# Patient Record
Sex: FEMALE | URBAN - METROPOLITAN AREA
[De-identification: names, ages, dates, MRNs, and addresses within clinical notes are randomized per-mention and may not be internally consistent; named-entity substitution may affect disease eponyms.]

---

## 2018-07-10 ENCOUNTER — TRANSFERRED RECORDS (OUTPATIENT)
Dept: HEALTH INFORMATION MANAGEMENT | Facility: CLINIC | Age: 68
End: 2018-07-10

## 2018-07-11 ENCOUNTER — TRANSFERRED RECORDS (OUTPATIENT)
Dept: HEALTH INFORMATION MANAGEMENT | Facility: CLINIC | Age: 68
End: 2018-07-11

## 2018-07-13 ENCOUNTER — TRANSFERRED RECORDS (OUTPATIENT)
Dept: HEALTH INFORMATION MANAGEMENT | Facility: CLINIC | Age: 68
End: 2018-07-13

## 2018-07-13 LAB
ANION GAP SERPL CALCULATED.3IONS-SCNC: NORMAL MMOL/L
BUN SERPL-MCNC: NORMAL MG/DL
CALCIUM SERPL-MCNC: NORMAL MG/DL
CHLORIDE SERPLBLD-SCNC: NORMAL MMOL/L
CO2 SERPL-SCNC: NORMAL MMOL/L
CREAT SERPL-MCNC: 0.73 MG/DL
GFR SERPL CREATININE-BSD FRML MDRD: >60 ML/MIN/1.73M2
GLUCOSE SERPL-MCNC: NORMAL MG/DL (ref 70–99)
POTASSIUM SERPL-SCNC: 3.8 MMOL/L
SODIUM SERPL-SCNC: 136 MMOL/L

## 2018-07-17 ENCOUNTER — TRANSFERRED RECORDS (OUTPATIENT)
Dept: HEALTH INFORMATION MANAGEMENT | Facility: CLINIC | Age: 68
End: 2018-07-17

## 2018-07-17 DIAGNOSIS — C79.31 METASTASIS TO BRAIN (H): Primary | ICD-10-CM

## 2018-07-18 ENCOUNTER — TRANSFERRED RECORDS (OUTPATIENT)
Dept: HEALTH INFORMATION MANAGEMENT | Facility: CLINIC | Age: 68
End: 2018-07-18

## 2018-07-19 ENCOUNTER — TRANSFERRED RECORDS (OUTPATIENT)
Dept: HEALTH INFORMATION MANAGEMENT | Facility: CLINIC | Age: 68
End: 2018-07-19

## 2018-08-07 ENCOUNTER — PRE VISIT (OUTPATIENT)
Dept: RADIATION ONCOLOGY | Facility: CLINIC | Age: 68
End: 2018-08-07

## 2018-08-07 NOTE — TELEPHONE ENCOUNTER
"Date: 2018   Age: 68 year old  Ethnicity:    Sex: female  : 1950   Lives In[place]      Diagnosis: Lung Cancer    Prior radiation therapy:   Site Treated: at  Facility: at  Dates: at  Dose: at    Site Treated: at  Facility: at  Dates: at  Dose: at    Prior chemotherapy:   See Below      Pain at time of consult, management plan if yes:  Does pt have a living will:  Is Pt Pregnant:  Does Pt have any implanted cardiac devices:    RN time with patient:   Doctors to \"cc\":  Dr. True Sauceda, Dr. Butt, Dr. Maddox     Pt was educated on Gamma Knife Procedure         Review Since Diagnosis:    Beginning 2018; one day woke up with headache, sore neck and right ear sore, progressed to bilateral hand weakness and gait instability    Treated with antibiotics and Meclizine for possible ear infection, no improvement    18; to ER at [facility], as now nausea and vomiting also    7/10/18; left upper lobe lung bronchial lavage, washing and brushing, showed positive for non small cell carcinoma.  Lesion (7.7 x 5.8 x 10.1 cm) left upper lobe biopsy showed non small cell carcinoma, favor adenocarcinoma, pending further testing     7/10/18; Brain MRI, 3.4 x 3.1 x 2.7 cm mass left cerebellum                                    1 cm mass periphery of left cerebellum                                    0.4 cm mass inferior left temporal    7/10/18; CT Chest/Abd/Pelvis, mass left upper lobe lung, at least four lesions left lower lobe, mediastinal and hilar adenopathy,     7/10/18; pt saw Dr. Butt, started dexamethasone and feeling better.  Discussed with Dr. Masters felt cerebellum lesion too large for Gamma Knife     18; pt saw Dr. Maddox, now possibly crani for left cerebellum lesion and then Gamma Knife, waiting for all of molecular markers to come back    18; crani for left posterior fossa tumor resection by Dr. True Sauceda, showed metastatic adenocarcinoma, consistent with lung "     7/18/18; Brain MRI, left posterior fossa crani with tumor resection of both the large and small satellite lesion    Chief Complaint: at consult

## 2018-08-08 ENCOUNTER — OFFICE VISIT (OUTPATIENT)
Dept: RADIATION ONCOLOGY | Facility: CLINIC | Age: 68
End: 2018-08-08
Attending: RADIOLOGY
Payer: COMMERCIAL

## 2018-08-08 VITALS
OXYGEN SATURATION: 96 % | HEIGHT: 64 IN | SYSTOLIC BLOOD PRESSURE: 98 MMHG | RESPIRATION RATE: 16 BRPM | WEIGHT: 111 LBS | DIASTOLIC BLOOD PRESSURE: 59 MMHG | BODY MASS INDEX: 18.95 KG/M2 | HEART RATE: 98 BPM

## 2018-08-08 DIAGNOSIS — C79.49 SECONDARY MALIGNANT NEOPLASM OF BRAIN AND SPINAL CORD (H): Primary | ICD-10-CM

## 2018-08-08 DIAGNOSIS — C79.31 SECONDARY MALIGNANT NEOPLASM OF BRAIN AND SPINAL CORD (H): Primary | ICD-10-CM

## 2018-08-08 PROCEDURE — G0463 HOSPITAL OUTPT CLINIC VISIT: HCPCS | Performed by: RADIOLOGY

## 2018-08-08 ASSESSMENT — ENCOUNTER SYMPTOMS
MUSCULOSKELETAL NEGATIVE: 1
DIARRHEA: 0
BLOOD IN STOOL: 0
VOMITING: 0
NAUSEA: 1
WEIGHT LOSS: 0
FEVER: 0
CONSTIPATION: 0
SEIZURES: 0
BLURRED VISION: 0
SHORTNESS OF BREATH: 0
DOUBLE VISION: 0
DIZZINESS: 0
PHOTOPHOBIA: 0

## 2018-08-08 NOTE — MR AVS SNAPSHOT
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)    -  1       Follow-ups after your visit        Your next 10 appointments already scheduled     Aug 22, 2018  6:00 AM CDT   GAMMA TREATMENT with Dante Masters MD   [facility]    [address] [address]   [phone]            Aug 22, 2018  8:00 AM CDT   MR BRAIN W CONTRAST with UUMR1   [facility], MRI ([facility])    [address] [place] [id number] [phone]           Take your medicines as usual, unless your doctor tells you not to. Bring a list of your current medicines to your exam (including vitamins, minerals and over-the-counter drugs).  You may or may not receive intravenous (IV) contrast for this exam pending the discretion of the Radiologist.  You do not need to do anything special to prepare.  The MRI machine uses a strong magnet. Please wear clothes without metal (snaps, zippers). A sweatsuit works well, or we may give you a hospital gown.  Please remove any body piercings and hair extensions before you arrive. You will also remove watches, jewelry, hairpins, wallets, dentures, partial dental plates and hearing aids. You may wear contact lenses, and you may be able to wear your rings. We have a safe place to keep your personal items, but it is safer to leave them at home.  **IMPORTANT** THE INSTRUCTIONS BELOW ARE ONLY FOR THOSE PATIENTS WHO HAVE BEEN PRESCRIBED SEDATION OR GENERAL ANESTHESIA DURING THEIR MRI PROCEDURE:  IF YOUR DOCTOR PRESCRIBED ORAL SEDATION (take medicine to help you relax during your  "exam):   You must get the medicine from your doctor (oral medication) before you arrive. Bring the medicine to the exam. Do not take it at home. You ll be told when to take it upon arriving for your exam.   Arrive one hour early. Bring someone who can take you home after the test. Your medicine will make you sleepy. After the exam, you may not drive, take a bus or take a taxi by yourself.  IF YOUR DOCTOR PRESCRIBED IV SEDATION:   Arrive one hour early. Bring someone who can take you home after the test. Your medicine will make you sleepy. After the exam, you may not drive, take a bus or take a taxi by yourself.   No eating 6 hours before your exam. You may have clear liquids up until 4 hours before your exam. (Clear liquids include water, clear tea, black coffee and fruit juice without pulp.)  IF YOUR DOCTOR PRESCRIBED ANESTHESIA (be asleep for your exam):   Arrive 1 1/2 hours early. Bring someone who can take you home after the test. You may not drive, take a bus or take a taxi by yourself.   No eating 8 hours before your exam. You may have clear liquids up until 4 hours before your exam. (Clear liquids include water, clear tea, black coffee and fruit juice without pulp.)   You will spend four to five hours in the recovery room.  Please call the Imaging Department at your exam site with any questions.              Who to contact     Please call your clinic at [phone] to:    Ask questions about your health    Make or cancel appointments    Discuss your medicines    Learn about your test results    Speak to your doctor            Additional Information About Your Visit        Care EveryWhere ID     This is your Care EveryWhere ID. This could be used by other organizations to access your [place] medical records  [id number]        Your Vitals Were     Pulse Respirations Height Pulse Oximetry BMI (Body Mass Index)       98 16 1.626 m (5' 4\") 96% 19.05 kg/m2        Blood Pressure from Last 3 Encounters:   No data " found for BP    Weight from Last 3 Encounters:   No data found for Wt              Today, you had the following     No orders found for display      Information about OPIOIDS     PRESCRIPTION OPIOIDS: WHAT YOU NEED TO KNOW   We gave you an opioid (narcotic) pain medicine. It is important to manage your pain, but opioids are not always the best choice. You should first try all the other options your care team gave you. Take this medicine for as short a time (and as few doses) as possible.    Some activities can increase your pain, such as bandage changes or therapy sessions. It may help to take your pain medicine 30 to 60 minutes before these activities. Reduce your stress by getting enough sleep, working on hobbies you enjoy and practicing relaxation or meditation. Talk to your care team about ways to manage your pain beyond prescription opioids.    These medicines have risks:    DO NOT drive when on new or higher doses of pain medicine. These medicines can affect your alertness and reaction times, and you could be arrested for driving under the influence (DUI). If you need to use opioids long-term, talk to your care team about driving.    DO NOT operate heavy machinery    DO NOT do any other dangerous activities while taking these medicines.    DO NOT drink any alcohol while taking these medicines.     If the opioid prescribed includes acetaminophen, DO NOT take with any other medicines that contain acetaminophen. Read all labels carefully. Look for the word  acetaminophen  or  Tylenol.  Ask your pharmacist if you have questions or are unsure.    You can get addicted to pain medicines, especially if you have a history of addiction (chemical, alcohol or substance dependence). Talk to your care team about ways to reduce this risk.    All opioids tend to cause constipation. Drink plenty of water and eat foods that have a lot of fiber, such as fruits, vegetables, prune juice, apple juice and high-fiber cereal. Take a  laxative (Miralax, milk of magnesia, Colace, Senna) if you don t move your bowels at least every other day. Other side effects include upset stomach, sleepiness, dizziness, throwing up, tolerance (needing more of the medicine to have the same effect), physical dependence and slowed breathing.    Store your pills in a secure place, locked if possible. We will not replace any lost or stolen medicine. If you don t finish your medicine, please throw away (dispose) as directed by your pharmacist. The [place] Pollution Control Agency has more information about safe disposal: https://www.pca.[facility].[place].us/living-green/managing-unwanted-medications         Primary Care Provider Office Phone # Fax #    [facility] [phone] [address] [address]        Equal Access to Services     ALYCE HOFF : Emily olivareso Sovirgilio, waaxda luqhaven, qaybta kaalmakervin villaseñor, abe marshall . So [facility] [phone].    ATENCIÓN: Si habla español, tiene a anderson disposición servicios gratuitos de asistencia lingüística. Madelin al [phone].    We comply with applicable federal civil rights laws and [place] laws. We do not discriminate on the basis of race, color, national origin, age, disability, sex, sexual orientation, or gender identity.            Thank you!     Thank you for choosing [facility], RADIATION ONCOLOGY  for your care. Our goal is always to provide you with excellent care. Hearing back from our patients is one way we can continue to improve our services. Please take a few minutes to complete the written survey that you may receive in the mail after your visit with us. Thank you!             Your Updated Medication List - Protect others around you: Learn how to safely use, store and throw away your medicines at www.disposemymeds.org.          This list is accurate as of 8/8/18 11:59 PM.  Always use your most recent med list.                    Brand Name Dispense Instructions for use Diagnosis    OXYCODONE HCL PO      Take 5 mg by mouth every 4 hours as needed

## 2018-08-08 NOTE — PROGRESS NOTES
Service Date: 2018      DIAGNOSIS:  Metastatic nonsmall cell lung cancer (adenocarcinoma) with 3 brain metastases status post surgical resection of 2 brain metastases.      HISTORY OF PRESENT ILLNESS:  The patient is a 68-year-old white female with a 50-pack-year smoking history who was in stable health until early 2018 when she developed headaches, sore neck, gait instability and a right earache.  She was treated with antibiotics and meclizine without benefit.  On 2018, she presented to the emergency room at [facility] with the above symptoms and also nausea and vomiting.  Brain MRI 07/10/2018 showed 3 brain metastases (3.4 cm left medial cerebellum, 1 cm left lateral cerebellum and 0.4 cm in the inferior left temporal).  Chest CT 07/10/2018 showed a left upper lung mass in addition to mediastinal and hilar adenopathy and smaller nodules in the left lower lung.        Bronchoscopic biopsy of the left upper lung mass, which measured 10.1 cm in size, confirmed nonsmall cell lung cancer (adenocarcinoma).  The patient was started on dexamethasone and her symptoms improved.  She was evaluated by Neurosurgery.  On 2018 she underwent a craniotomy and surgical resection of the 2 metastases in the left cerebellum.  Postoperative MRI 2018 showed postoperative change and the left temporal metastasis.  Her dexamethasone was tapered off.  Her postoperative course was uncomplicated.        She is referred at this time for consideration of Gamma Knife radiosurgery to the remaining left temporal brain metastasis and the surgical cavity in the left cerebellum.  Her chief complaint today is some incisional tenderness at the craniotomy site and some difficulty with balance but it is much improved and getting better since surgery.      PAST MEDICAL HISTORY:   1.  Tobacco abuse.   2.  Bilateral cataracts.   3.  Status post  section.   4.  Status post bilateral tubal ligation.   5.  Status post  vaginal hysterectomy.      MEDICATIONS:     1.  Oxycodone p.r.n.      ALLERGIES:  Shellfish.      REVIEW OF SYSTEMS:  All systems were reviewed and found to be otherwise negative.      SOCIAL HISTORY:  The patient is  and has 2 children.  She was employed at [employer] in the [employer].  She lives in [place], [place] with her .  She is accompanied to today's appointment by her .      HABITS:  She smoked 1/2 to 1 pack of cigarettes per day for 50 years but quit on 07/09/2018.  She will occasionally have an alcoholic beverages.      FAMILY HISTORY:  Her father had lung cancer, her brother had colon cancer, her maternal grandmother had ovarian cancer and a maternal aunt had breast cancer.      PHYSICAL EXAMINATION:   GENERAL:  Well-developed, well-nourished white female in no acute distress, alert and oriented, pleasant and cooperative.   VITAL SIGNS:  Afebrile, vital signs stable.  Height 5 feet 4, weight 111 pounds, blood pressure 98/59, heart rate 98, respirations 12, oxygen saturation on room air 96%.  BMI 19.1.   HEENT:  Reveals the occipital craniotomy scar which appears to be healing well.   NECK:  Supple without adenopathy.   LUNGS:  Clear to auscultation.   HEART:  Regular rate and rhythm.   ABDOMEN:  Soft, nontender.   EXTREMITIES:  Without cyanosis, clubbing or edema.   NEUROLOGIC:  Nonfocal, although she does walk with a somewhat slow, cautious gait.      IMPRESSION:  Metastatic nonsmall cell lung cancer (adenocarcinoma) with 3 brain metastases, status post surgical resection of 2 left cerebellar brain metastases.      RECOMMENDATIONS:  The diagnosis, prognosis and therapeutic options were reviewed with the patient and her .  The pre- and postoperative brain MRI images were reviewed with them and the tumors and surgical cavity were pointed out.  I do not recommend whole brain radiation therapy.  I do recommend Gamma Knife radiosurgery to the left temporal brain  metastasis as well as the surgical cavity in the left cerebellum.  The rationale for this approach as well as the procedures, risks, benefits and potential side effects were explained.  The patient and her  appeared to understand and agree to proceed.  The procedure is scheduled for 2018 at the [facility] at the [facility].      Thank you very much for asking me to see this pleasant woman and to share in her evaluation.        cc:      Radha Reddy MD   [facility]   [address]   [place] [address]      Marlen Maddox MD    [facility]   [address]   [place] [address]      MD Edward Burnham MD    [facility]   [facility] [address] [facility] [place] [address]       Aliza Sprague MD       Tumor Registry         REINALDO MARTINEZ MD             D: 2018   T: 2018   MT: NATALIA      Name:     ROSIO GARCIA   MRN:      [id number]        Account:      [id number]   :      1950           Service Date: 2018      Document: [id number]

## 2018-08-08 NOTE — PROGRESS NOTES
"  HPI    Date: 2018   Age: 68 year old  Ethnicity:    Sex: female  : 1950   L[place]      Diagnosis: Lung Cancer    Prior radiation therapy:   none    Prior chemotherapy:   None to date      Pain at time of consult, management plan if yes: pt denies pain at time of consult  Does pt have a living will: pt does not have  Is Pt Pregnant: Age 68  Does Pt have any implanted cardiac devices: pt has no implanted cardiac devices    RN time with patient:  55 minutes   Doctors to \"cc\":  Dr. True Sauceda, Dr. Butt, Dr. Maddox ,  Dr. Felicita Sprague-family    Pt was educated on Gamma Knife Procedure ; yes        Review Since Diagnosis:    Beginning 2018; one day woke up with headache, sore neck and right ear sore, progressed to bilateral hand weakness and gait instability    Treated with antibiotics and Meclizine for possible ear infection, no improvement    18; to ER at [facility], as now nausea and vomiting also    7/10/18; left upper lobe lung bronchial lavage, washing and brushing, showed positive for non small cell carcinoma.  Lesion (7.7 x 5.8 x 10.1 cm) left upper lobe biopsy showed non small cell carcinoma, favor adenocarcinoma, pending further testing     7/10/18; Brain MRI, 3.4 x 3.1 x 2.7 cm mass left cerebellum                                    1 cm mass periphery of left cerebellum                                    0.4 cm mass inferior left temporal    7/10/18; CT Chest/Abd/Pelvis, mass left upper lobe lung, at least four lesions left lower lobe, mediastinal and hilar adenopathy,     7/10/18; pt saw Dr. Butt, started dexamethasone and feeling better.  Discussed with Dr. Masters felt cerebellum lesion too large for Gamma Knife     18; pt saw Dr. Maddox, now possibly crani for left cerebellum lesion and then Gamma Knife, waiting for all of molecular markers to come back    18; crani for left posterior fossa tumor resection by Dr. True Sauceda, showed " metastatic adenocarcinoma, consistent with lung     7/18/18; Brain MRI, left posterior fossa crani with tumor resection of both the large and small satellite lesion    7/20/18; discharged from [facility]    Dr. Reddy is going to be the Med/Onc, have seen once and wait until after Gamma Knife, then start therapy      Chief Complaint: pt notes a slight headache once in awhile not daily, pt notes walking much better since surgery, not normal yet, nausea on occasion but no vomiting, pt feels hand weakness no longer,               Review of Systems   Constitutional: Positive for malaise/fatigue. Negative for fever and weight loss.   HENT: Negative for hearing loss.    Eyes: Negative for blurred vision, double vision and photophobia.   Respiratory: Negative for shortness of breath.    Cardiovascular: Negative for chest pain and leg swelling.   Gastrointestinal: Positive for nausea. Negative for blood in stool, constipation, diarrhea and vomiting.   Genitourinary: Negative.    Musculoskeletal: Negative.    Neurological: Negative for dizziness and seizures.   Psychiatric/Behavioral:        Pt said Dr. Reddy gave a Rx for an antidepressant if she wants but has not started

## 2018-08-08 NOTE — LETTER
"2018       RE: Saritha Church  [address]  [place] [address]     Dear Colleague,    Thank you for referring your patient, Saritha Church, to the [facility], [place], RADIATION ONCOLOGY. Please see a copy of my visit note below.      HPI    Date: 2018   Age: 68 year old  Ethnicity:    Sex: female  : 1950   Lives In: [place]      Diagnosis: Lung Cancer    Prior radiation therapy:   none    Prior chemotherapy:   None to date      Pain at time of consult, management plan if yes: pt denies pain at time of consult  Does pt have a living will: pt does not have  Is Pt Pregnant: Age 68  Does Pt have any implanted cardiac devices: pt has no implanted cardiac devices    RN time with patient:  55 minutes   Doctors to \"cc\":  Dr. True Sauceda, Dr. Butt, Dr. Maddox ,  Dr. Felicita Sprague-family    Pt was educated on Gamma Knife Procedure ; yes        Review Since Diagnosis:    Beginning 2018; one day woke up with headache, sore neck and right ear sore, progressed to bilateral hand weakness and gait instability    Treated with antibiotics and Meclizine for possible ear infection, no improvement    18; to ER at [facility], as now nausea and vomiting also    7/10/18; left upper lobe lung bronchial lavage, washing and brushing, showed positive for non small cell carcinoma.  Lesion (7.7 x 5.8 x 10.1 cm) left upper lobe biopsy showed non small cell carcinoma, favor adenocarcinoma, pending further testing     7/10/18; Brain MRI, 3.4 x 3.1 x 2.7 cm mass left cerebellum                                    1 cm mass periphery of left cerebellum                                    0.4 cm mass inferior left temporal    7/10/18; CT Chest/Abd/Pelvis, mass left upper lobe lung, at least four lesions left lower lobe, mediastinal and hilar adenopathy,     7/10/18; pt saw Dr. Butt, started dexamethasone and feeling better.  Discussed with Dr. Masters felt cerebellum lesion too large for Gamma Knife "     7/11/18; pt saw Dr. Maddox, now possibly crani for left cerebellum lesion and then Gamma Knife, waiting for all of molecular markers to come back    7/17/18; crani for left posterior fossa tumor resection by Dr. True Sauceda, showed metastatic adenocarcinoma, consistent with lung     7/18/18; Brain MRI, left posterior fossa crani with tumor resection of both the large and small satellite lesion    7/20/18; discharged from [facility]    Dr. Reddy is going to be the Med/Onc, have seen once and wait until after Gamma Knife, then start therapy      Chief Complaint: pt notes a slight headache once in awhile not daily, pt notes walking much better since surgery, not normal yet, nausea on occasion but no vomiting, pt feels hand weakness no longer,               Review of Systems   Constitutional: Positive for malaise/fatigue. Negative for fever and weight loss.   HENT: Negative for hearing loss.    Eyes: Negative for blurred vision, double vision and photophobia.   Respiratory: Negative for shortness of breath.    Cardiovascular: Negative for chest pain and leg swelling.   Gastrointestinal: Positive for nausea. Negative for blood in stool, constipation, diarrhea and vomiting.   Genitourinary: Negative.    Musculoskeletal: Negative.    Neurological: Negative for dizziness and seizures.   Psychiatric/Behavioral:        Pt said Dr. Reddy gave a Rx for an antidepressant if she wants but has not started                 Service Date: 08/08/2018      DIAGNOSIS:  Metastatic nonsmall cell lung cancer (adenocarcinoma) with 3 brain metastases status post surgical resection of 2 brain metastases.      HISTORY OF PRESENT ILLNESS:  The patient is a 68-year-old white female with a 50-pack-year smoking history who was in stable health until early 07/2018 when she developed headaches, sore neck, gait instability and a right earache.  She was treated with antibiotics and meclizine without benefit.  On 07/09/2018, she presented to the  emergency room at [facility] with the above symptoms and also nausea and vomiting.  Brain MRI 07/10/2018 showed 3 brain metastases (3.4 cm left medial cerebellum, 1 cm left lateral cerebellum and 0.4 cm in the inferior left temporal).  Chest CT 07/10/2018 showed a left upper lung mass in addition to mediastinal and hilar adenopathy and smaller nodules in the left lower lung.        Bronchoscopic biopsy of the left upper lung mass, which measured 10.1 cm in size, confirmed nonsmall cell lung cancer (adenocarcinoma).  The patient was started on dexamethasone and her symptoms improved.  She was evaluated by Neurosurgery.  On 2018 she underwent a craniotomy and surgical resection of the 2 metastases in the left cerebellum.  Postoperative MRI 2018 showed postoperative change and the left temporal metastasis.  Her dexamethasone was tapered off.  Her postoperative course was uncomplicated.        She is referred at this time for consideration of Gamma Knife radiosurgery to the remaining left temporal brain metastasis and the surgical cavity in the left cerebellum.  Her chief complaint today is some incisional tenderness at the craniotomy site and some difficulty with balance but it is much improved and getting better since surgery.      PAST MEDICAL HISTORY:   1.  Tobacco abuse.   2.  Bilateral cataracts.   3.  Status post  section.   4.  Status post bilateral tubal ligation.   5.  Status post vaginal hysterectomy.      MEDICATIONS:     1.  Oxycodone p.r.n.      ALLERGIES:  Shellfish.      REVIEW OF SYSTEMS:  All systems were reviewed and found to be otherwise negative.      SOCIAL HISTORY:  The patient is  and has 2 children.  She was employed at [employer] in the [employer] Department.  She lives in [place], [place] with her .  She is accompanied to today's appointment by her .      HABITS:  She smoked 1/2 to 1 pack of cigarettes per day for 50 years but quit on  07/09/2018.  She will occasionally have an alcoholic beverages.      FAMILY HISTORY:  Her father had lung cancer, her brother had colon cancer, her maternal grandmother had ovarian cancer and a maternal aunt had breast cancer.      PHYSICAL EXAMINATION:   GENERAL:  Well-developed, well-nourished white female in no acute distress, alert and oriented, pleasant and cooperative.   VITAL SIGNS:  Afebrile, vital signs stable.  Height 5 feet 4, weight 111 pounds, blood pressure 98/59, heart rate 98, respirations 12, oxygen saturation on room air 96%.  BMI 19.1.   HEENT:  Reveals the occipital craniotomy scar which appears to be healing well.   NECK:  Supple without adenopathy.   LUNGS:  Clear to auscultation.   HEART:  Regular rate and rhythm.   ABDOMEN:  Soft, nontender.   EXTREMITIES:  Without cyanosis, clubbing or edema.   NEUROLOGIC:  Nonfocal, although she does walk with a somewhat slow, cautious gait.      IMPRESSION:  Metastatic nonsmall cell lung cancer (adenocarcinoma) with 3 brain metastases, status post surgical resection of 2 left cerebellar brain metastases.      RECOMMENDATIONS:  The diagnosis, prognosis and therapeutic options were reviewed with the patient and her .  The pre- and postoperative brain MRI images were reviewed with them and the tumors and surgical cavity were pointed out.  I do not recommend whole brain radiation therapy.  I do recommend Gamma Knife radiosurgery to the left temporal brain metastasis as well as the surgical cavity in the left cerebellum.  The rationale for this approach as well as the procedures, risks, benefits and potential side effects were explained.  The patient and her  appeared to understand and agree to proceed.  The procedure is scheduled for 08/22/2018 at the Gamma Knife Center at the [facility].      Thank you very much for asking me to see this pleasant woman and to share in her evaluation.           REINALDO MARTINEZ MD      cc:      Radha  MD Barry   [facility]   [address]   [place] [address]      Marlen Maddox MD    [facility]   [address]   [place] [address]      MD Edward Burnham MD    [facility]   [address] [place] [address]       Aliza Sprague MD       Tumor Registry                 D: 2018   T: 2018   MT: NATALIA      Name:     ROSIO GARCIA   MRN:      [id number]        Account:      [id number]   :      1950           Service Date: 2018      Document: [id number]

## 2018-08-22 ENCOUNTER — HOSPITAL ENCOUNTER (OUTPATIENT)
Dept: MRI IMAGING | Facility: CLINIC | Age: 68
Discharge: HOME | End: 2018-08-22
Attending: NEUROLOGICAL SURGERY | Admitting: NEUROLOGICAL SURGERY
Payer: COMMERCIAL

## 2018-08-22 ENCOUNTER — APPOINTMENT (OUTPATIENT)
Dept: MEDSURG UNIT | Facility: CLINIC | Age: 68
End: 2018-08-22
Attending: RADIOLOGY
Payer: COMMERCIAL

## 2018-08-22 ENCOUNTER — OFFICE VISIT (OUTPATIENT)
Dept: RADIATION ONCOLOGY | Facility: CLINIC | Age: 68
End: 2018-08-22
Attending: RADIOLOGY
Payer: COMMERCIAL

## 2018-08-22 VITALS
SYSTOLIC BLOOD PRESSURE: 108 MMHG | RESPIRATION RATE: 16 BRPM | OXYGEN SATURATION: 92 % | DIASTOLIC BLOOD PRESSURE: 62 MMHG | HEART RATE: 86 BPM

## 2018-08-22 VITALS
OXYGEN SATURATION: 89 % | SYSTOLIC BLOOD PRESSURE: 130 MMHG | RESPIRATION RATE: 16 BRPM | HEART RATE: 98 BPM | DIASTOLIC BLOOD PRESSURE: 73 MMHG

## 2018-08-22 DIAGNOSIS — C79.31 METASTASIS TO BRAIN (H): ICD-10-CM

## 2018-08-22 DIAGNOSIS — C79.31 METASTASIS TO BRAIN (H): Primary | ICD-10-CM

## 2018-08-22 PROCEDURE — 77300 RADIATION THERAPY DOSE PLAN: CPT | Performed by: RADIOLOGY

## 2018-08-22 PROCEDURE — 25000131 ZZH RX MED GY IP 250 OP 636 PS 637: Mod: ZF | Performed by: NEUROLOGICAL SURGERY

## 2018-08-22 PROCEDURE — 40000172 ZZH STATISTIC PROCEDURE PREP ONLY

## 2018-08-22 PROCEDURE — 77295 3-D RADIOTHERAPY PLAN: CPT | Performed by: RADIOLOGY

## 2018-08-22 PROCEDURE — 25000132 ZZH RX MED GY IP 250 OP 250 PS 637: Mod: ZF | Performed by: NEUROLOGICAL SURGERY

## 2018-08-22 PROCEDURE — 77371 SRS MULTISOURCE: CPT | Performed by: RADIOLOGY

## 2018-08-22 PROCEDURE — 25000128 H RX IP 250 OP 636: Performed by: NEUROLOGICAL SURGERY

## 2018-08-22 PROCEDURE — 25000125 ZZHC RX 250: Mod: ZF | Performed by: NEUROLOGICAL SURGERY

## 2018-08-22 PROCEDURE — A9585 GADOBUTROL INJECTION: HCPCS | Performed by: NEUROLOGICAL SURGERY

## 2018-08-22 PROCEDURE — 77370 RADIATION PHYSICS CONSULT: CPT | Performed by: RADIOLOGY

## 2018-08-22 PROCEDURE — 77470 SPECIAL RADIATION TREATMENT: CPT | Mod: XU | Performed by: RADIOLOGY

## 2018-08-22 PROCEDURE — 70552 MRI BRAIN STEM W/DYE: CPT

## 2018-08-22 PROCEDURE — 77334 RADIATION TREATMENT AID(S): CPT | Performed by: RADIOLOGY

## 2018-08-22 RX ORDER — LIDOCAINE 40 MG/G
1 CREAM TOPICAL SEE ADMIN INSTRUCTIONS
Status: COMPLETED | OUTPATIENT
Start: 2018-08-22 | End: 2018-08-22

## 2018-08-22 RX ORDER — DEXAMETHASONE 4 MG/1
4 TABLET ORAL
Status: COMPLETED | OUTPATIENT
Start: 2018-08-22 | End: 2018-08-22

## 2018-08-22 RX ORDER — ONDANSETRON 2 MG/ML
4 INJECTION INTRAMUSCULAR; INTRAVENOUS
Status: DISCONTINUED | OUTPATIENT
Start: 2018-08-22 | End: 2018-08-22 | Stop reason: HOSPADM

## 2018-08-22 RX ORDER — LORAZEPAM 0.5 MG/1
.5-2 TABLET ORAL
Status: COMPLETED | OUTPATIENT
Start: 2018-08-22 | End: 2018-08-22

## 2018-08-22 RX ORDER — ACETAMINOPHEN 325 MG/1
650 TABLET ORAL EVERY 4 HOURS PRN
Status: DISCONTINUED | OUTPATIENT
Start: 2018-08-22 | End: 2018-08-22 | Stop reason: HOSPADM

## 2018-08-22 RX ORDER — HYDROCODONE BITARTRATE AND ACETAMINOPHEN 5; 325 MG/1; MG/1
1-2 TABLET ORAL EVERY 6 HOURS PRN
Status: DISCONTINUED | OUTPATIENT
Start: 2018-08-22 | End: 2018-08-22 | Stop reason: HOSPADM

## 2018-08-22 RX ORDER — LORAZEPAM 0.5 MG/1
.5-1 TABLET ORAL
Status: DISCONTINUED | OUTPATIENT
Start: 2018-08-22 | End: 2018-08-22 | Stop reason: HOSPADM

## 2018-08-22 RX ORDER — ONDANSETRON 4 MG/1
8 TABLET, ORALLY DISINTEGRATING ORAL EVERY 6 HOURS PRN
Status: DISCONTINUED | OUTPATIENT
Start: 2018-08-22 | End: 2018-08-22 | Stop reason: HOSPADM

## 2018-08-22 RX ORDER — GADOBUTROL 604.72 MG/ML
7.5 INJECTION INTRAVENOUS ONCE
Status: COMPLETED | OUTPATIENT
Start: 2018-08-22 | End: 2018-08-22

## 2018-08-22 RX ADMIN — LIDOCAINE 1 G: 40 CREAM TOPICAL at 06:08

## 2018-08-22 RX ADMIN — GADOBUTROL 5 ML: 604.72 INJECTION INTRAVENOUS at 07:53

## 2018-08-22 RX ADMIN — LORAZEPAM 2 MG: 0.5 TABLET ORAL at 06:08

## 2018-08-22 RX ADMIN — DEXAMETHASONE 4 MG: 4 TABLET ORAL at 12:19

## 2018-08-22 RX ADMIN — BUPIVACAINE HYDROCHLORIDE 30 ML: 7.5 INJECTION, SOLUTION EPIDURAL; RETROBULBAR at 06:07

## 2018-08-22 NOTE — PROGRESS NOTES
Pt arrived to 2A with family for gamma knife. VSS, pt sleepy but arousable. Food and drink offered. Continue to monitor

## 2018-08-22 NOTE — LETTER
8/22/2018       RE: Saritha Church  [address] [place] [id number]     Dear Colleague,    Thank you for referring your patient, Saritha Church, to the [facility], [place], RADIATION ONCOLOGY. Please see a copy of my visit note below.    PREOPERATIVE DIAGNOSIS:  Metastatic non-small cell lung cancer    POSTOPERATIVE DIAGNOSIS:  Same    OPERATIVE PROCEDURES:  1.  Gamma Knife radiosurgery to 2 metastatic lesions, including 1 complex lesion  2.  Application of stereotactic head frame for stereotactic radiosurgery    SURGEON:  Suleman Newberry MD    ASSISTANT:  None    ANESTHESIA:  Local    INDICATIONS FOR THE PROCEDURE:  This patient has metastatic non-small cell lung cancer with brain metastases.  She underwent left posterior fossa craniotomy with Dr. Sauceda in July 2018 for resection of 2 cerebellar lesions.  Gamma Knife stereotactic radiosurgery was now recommended to treat the complex resection cavity measuring greater than 3.5cm, as well as a smaller left temporal metastasis.    DESCRIPTION OF THE PROCEDURE:  On the morning of the procedure, the patient was brought to the Gamma Knife radiosurgery area.  A pre-procedure pause was performed to confirm the identity and date of birth of the patient, as well as the procedure site.  The scalp was prepped with betadine and then anesthetized with a combination of marcaine, lidocaine, and epinephrine.  The Leksell G-frame was applied and the pins were fixed to hand tightness.  The patient tolerated the application of the frame well.  A depth helmet was placed and pin and post measurements were taken.    The patient was taken to the MRI scanner where an MRI with gadolinium contrast was obtained.  The patient returned from MRI and all measurements were checked to make sure that the frame had not moved.  The lesions were outlined on the planning software and we made a conformal dose outline for each of these lesions.  Dr. Masters selected the radiation dose for each  lesion.  Measurements were taken,  steps performed, and the patient was then brought into the treatment room.  Radiation was given according to the prescription.    The patient was then taken out of the unit and out of the treatment room.  The stereotactic frame was removed and a dressing was applied.  After observation, the patient was discharged.  Follow up arrangements will be made for the patient.    I attest that I was present for the entirety of the case, including pre-procedure assessment, stereotactic head frame placement, treatment planning, treatment delivery, as well as frame removal at the end of the treatment.      GAMMA KNIFE RADIOSURGERY TREATMENT SUMMARY  DEPARTMENT OF RADIATION ONCOLOGY    Name: Saritha Church                                        : 1950                 Medical Record #: [id number]                       Diagnosis:   Non Small Cell Lung Cancer                                  Date of Treatment: 2018                                       Referring Physicians:  Radha Reddy, Marlen Maddox, True Sauceda, Aliza Sprague, Edward Butt, Tumor Registry     BRIEF CLINICAL HISTORY:                                                                                                 The patient is a 68-year-old white female with a 50-pack-year smoking history who was in stable health until early 2018 when she developed headaches, sore neck, gait instability and a right earache.  She was treated with antibiotics and meclizine without benefit.  On 2018, she presented to the emergency room at [facility] with the above symptoms and also nausea and vomiting.  Brain MRI 07/10/2018 showed 3 brain metastases (3.4 cm left medial cerebellum, 1 cm left lateral cerebellum and 0.4 cm in the inferior left temporal).  Chest CT 07/10/2018 showed a left upper lung mass in addition to mediastinal and hilar adenopathy and smaller nodules in the left lower lung. Bronchoscopic  biopsy of the left upper lung mass, which measured 10.1 cm in size, confirmed nonsmall cell lung cancer (adenocarcinoma).  The patient was started on dexamethasone and her symptoms improved.  She was evaluated by Neurosurgery.  On 07/17/2018 she underwent a craniotomy and surgical resection of the 2 metastases in the left cerebellum.  Postoperative MRI 07/18/2018 showed postoperative change and the left temporal metastasis.  Her dexamethasone was tapered off.  Her postoperative course was uncomplicated. She is referred at this time for consideration of Gamma Knife radiosurgery to the remaining left temporal brain metastasis and the surgical cavity in the left cerebellum.  Her chief complaint today is some incisional tenderness at the craniotomy site and some difficulty with balance but it is much improved and getting better since surgery.   TECHNICAL SUMMARY        Collimators    Lesion Number Site Energy Minimum Tumor Dose (Gy) Isodose Line (%) Numbers Size (mm) Treatment Volume (cc)   1 Left Temporal [place] 60 18 50 1 4 0.17   2 Left Crbblr  SurgCavity [place] 60 18 50 7 18 22.48     DESCRIPTION OF PROCEDURE:                                                                              On 8/22/2018 the patient was brought to the Gamma Knife suite at [facility].  After sedation and topical anesthetic, the head frame was put on by Dr. Suleman Newberry.  The patient was then taken to the department of Radiology where a stereotactic brain MRI was performed.  The patient was admitted to the [facility] where she rested comfortably while treatment planning was completed.  The Leksell Gamma Plan software was used to create a highly conformal dose distribution using the number and size collimators detailed above.  The patient was brought to the Gamma Knife suite.  The treatment was delivered using the Model C Leksell Gamma Knife without complication.  The head frame was removed and the  patient was discharged home in stable condition.  FOLLOW-UP PLANS:                                                                                                        The Gamma Knife Nurse Coordinator will call the patient tomorrow for short-term follow up.  She should have a brain MRI in 2 months and every 3 months thereafter.  The patient will also follow-up with (jojo Reddy and soon start Gemcitabine chemotherapy.  I would be happy to see her anytime.    Dante Masters MD, [facility], [facility]

## 2018-08-22 NOTE — PROGRESS NOTES
GAMMA KNIFE RADIOSURGERY TREATMENT SUMMARY  DEPARTMENT OF RADIATION ONCOLOGY    Name: Saritha Church                                        : 1950                 Medical Record #: [id number]                       Diagnosis:   Non Small Cell Lung Cancer                                  Date of Treatment: 2018                                       Referring Physicians:  Radha Reddy, Marlen Maddox, True Sauceda, Aliza Sprague, Edward Butt, Tumor Registry     BRIEF CLINICAL HISTORY:                                                                                                 The patient is a 68-year-old white female with a 50-pack-year smoking history who was in stable health until early 2018 when she developed headaches, sore neck, gait instability and a right earache.  She was treated with antibiotics and meclizine without benefit.  On 2018, she presented to the emergency room at [facility] with the above symptoms and also nausea and vomiting.  Brain MRI 07/10/2018 showed 3 brain metastases (3.4 cm left medial cerebellum, 1 cm left lateral cerebellum and 0.4 cm in the inferior left temporal).  Chest CT 07/10/2018 showed a left upper lung mass in addition to mediastinal and hilar adenopathy and smaller nodules in the left lower lung. Bronchoscopic biopsy of the left upper lung mass, which measured 10.1 cm in size, confirmed nonsmall cell lung cancer (adenocarcinoma).  The patient was started on dexamethasone and her symptoms improved.  She was evaluated by Neurosurgery.  On 2018 she underwent a craniotomy and surgical resection of the 2 metastases in the left cerebellum.  Postoperative MRI 2018 showed postoperative change and the left temporal metastasis.  Her dexamethasone was tapered off.  Her postoperative course was uncomplicated. She is referred at this time for consideration of Gamma Knife radiosurgery to the remaining left temporal brain metastasis and the surgical  cavity in the left cerebellum.  Her chief complaint today is some incisional tenderness at the craniotomy site and some difficulty with balance but it is much improved and getting better since surgery.   TECHNICAL SUMMARY        Collimators    Lesion Number Site Energy Minimum Tumor Dose (Gy) Isodose Line (%) Numbers Size (mm) Treatment Volume (cc)   1 Left Temporal [place] 60 18 50 1 4 0.17   2 Left Crbblr  SurgCavity [place] 60 18 50 7 18 22.48     DESCRIPTION OF PROCEDURE:                                                                              On 8/22/2018 the patient was brought to the Gamma Knife suite at [facility].  After sedation and topical anesthetic, the head frame was put on by Dr. Suleman Newberry.  The patient was then taken to the department of Radiology where a stereotactic brain MRI was performed.  The patient was admitted to the [facility] where she rested comfortably while treatment planning was completed.  The Leksell Gamma Plan software was used to create a highly conformal dose distribution using the number and size collimators detailed above.  The patient was brought to the Gamma Knife suite.  The treatment was delivered using the Model C Leksell Gamma Knife without complication.  The head frame was removed and the patient was discharged home in stable condition.  FOLLOW-UP PLANS:                                                                                                        The Gamma Knife Nurse Coordinator will call the patient tomorrow for short-term follow up.  She should have a brain MRI in 2 months and every 3 months thereafter.  The patient will also follow-up with Dr Barry (s) and soon start Gemcitabine chemotherapy.  I would be happy to see her anytime.    Dante Masters MD, [facility], CAT

## 2018-08-22 NOTE — MR AVS SNAPSHOT
After Visit Summary   8/22/2018    Saritha Church    MRN: [id number]           Patient Information     Date Of Birth          1950        Visit Information        Provider Department      8/22/2018 6:00 AM Dante Masters MD [facility], [place], Radiation Oncology        Today's Diagnoses     Metastasis to brain (H)    -  1       Follow-ups after your visit        Future tests that were ordered for you today     Open Standing Orders        Priority Remaining Interval Expires Ordered    Oxygen: Nasal cannula Routine 39118/10821 PRN  8/22/2018    If Patient Diabetic: Glucose monitor nursing POCT Routine 08013/28282 PRN 8/23/2018 8/22/2018            Who to contact     Please call your clinic at [phone] to:    Ask questions about your health    Make or cancel appointments    Discuss your medicines    Learn about your test results    Speak to your doctor            Additional Information About Your Visit        Care EveryWhere ID     This is your Care EveryWhere ID. This could be used by other organizations to access your [place] medical records  [id number]        Your Vitals Were     Pulse Respirations Pulse Oximetry             98 16 89%          Blood Pressure from Last 3 Encounters:   08/22/18 108/62   08/22/18 130/73   08/08/18 98/59    Weight from Last 3 Encounters:   08/08/18 50.3 kg (111 lb)              Today, you had the following     No orders found for display       Primary Care Provider Office Phone # Fax #    [facility] [phone] [address] [address] [facility] [address]        Equal Access to Services     ALYCE HOFF AH: Hadii callie olivareso Sovirgilio, waaxda luqadaha, qaybta kaalmada adeegyada, abe haji. So [facility] [phone].    ATENCIÓN: Si habla español, tiene a anderson disposición servicios gratuitos de asistencia lingüística. Llame al [phone].    We comply with applicable federal civil rights laws  and [place] laws. We do not discriminate on the basis of race, color, national origin, age, disability, sex, sexual orientation, or gender identity.            Thank you!     Thank you for choosing [facility], [place], RADIATION ONCOLOGY  for your care. Our goal is always to provide you with excellent care. Hearing back from our patients is one way we can continue to improve our services. Please take a few minutes to complete the written survey that you may receive in the mail after your visit with us. Thank you!             Your Updated Medication List - Protect others around you: Learn how to safely use, store and throw away your medicines at www.disposemymeds.org.          This list is accurate as of 8/22/18  3:15 PM.  Always use your most recent med list.                   Brand Name Dispense Instructions for use Diagnosis    OXYCODONE HCL PO      Take 5 mg by mouth every 4 hours as needed

## 2018-08-22 NOTE — PROGRESS NOTES
PREOPERATIVE DIAGNOSIS:  Metastatic non-small cell lung cancer    POSTOPERATIVE DIAGNOSIS:  Same    OPERATIVE PROCEDURES:  1.  Gamma Knife radiosurgery to 2 metastatic lesions, including 1 complex lesion  2.  Application of stereotactic head frame for stereotactic radiosurgery    SURGEON:  Suleman Newberry MD    ASSISTANT:  None    ANESTHESIA:  Local    INDICATIONS FOR THE PROCEDURE:  This patient has metastatic non-small cell lung cancer with brain metastases.  She underwent left posterior fossa craniotomy with Dr. Sauceda in July 2018 for resection of 2 cerebellar lesions.  Gamma Knife stereotactic radiosurgery was now recommended to treat the complex resection cavity measuring greater than 3.5cm, as well as a smaller left temporal metastasis.    DESCRIPTION OF THE PROCEDURE:  On the morning of the procedure, the patient was brought to the Gamma Knife radiosurgery area.  A pre-procedure pause was performed to confirm the identity and date of birth of the patient, as well as the procedure site.  The scalp was prepped with betadine and then anesthetized with a combination of marcaine, lidocaine, and epinephrine.  The [employer] G-frame was applied and the pins were fixed to hand tightness.  The patient tolerated the application of the frame well.  A depth helmet was placed and pin and post measurements were taken.    The patient was taken to the MRI scanner where an MRI with gadolinium contrast was obtained.  The patient returned from MRI and all measurements were checked to make sure that the frame had not moved.  The lesions were outlined on the planning software and we made a conformal dose outline for each of these lesions.  Dr. Masters selected the radiation dose for each lesion.  Measurements were taken,  steps performed, and the patient was then brought into the treatment room.  Radiation was given according to the prescription.    The patient was then taken out of the unit and out of the  treatment room.  The stereotactic frame was removed and a dressing was applied.  After observation, the patient was discharged.  Follow up arrangements will be made for the patient.    I attest that I was present for the entirety of the case, including pre-procedure assessment, stereotactic head frame placement, treatment planning, treatment delivery, as well as frame removal at the end of the treatment.

## 2018-08-23 ENCOUNTER — TELEPHONE (OUTPATIENT)
Dept: RADIATION ONCOLOGY | Facility: CLINIC | Age: 68
End: 2018-08-23

## 2018-08-23 NOTE — TELEPHONE ENCOUNTER
A call was placed to Saritha in follow up to her Gamma Knife treatment on 8/22/18.  I spoke to Michael, Saritha's , who stated Saritha has done well.  Saritha had a slight headache this morning, took tylenol which relieved it.  Michael said she slept well and seems to be doing fine.

## 2019-04-03 ENCOUNTER — TRANSFERRED RECORDS (OUTPATIENT)
Dept: HEALTH INFORMATION MANAGEMENT | Facility: CLINIC | Age: 69
End: 2019-04-03

## 2019-09-20 ENCOUNTER — TRANSFERRED RECORDS (OUTPATIENT)
Dept: HEALTH INFORMATION MANAGEMENT | Facility: CLINIC | Age: 69
End: 2019-09-20

## 2019-09-24 ENCOUNTER — TRANSFERRED RECORDS (OUTPATIENT)
Dept: HEALTH INFORMATION MANAGEMENT | Facility: CLINIC | Age: 69
End: 2019-09-24

## 2019-10-09 ENCOUNTER — TRANSFERRED RECORDS (OUTPATIENT)
Dept: HEALTH INFORMATION MANAGEMENT | Facility: CLINIC | Age: 69
End: 2019-10-09

## 2019-10-10 DIAGNOSIS — C79.31 METASTASIS TO BRAIN (H): Primary | ICD-10-CM

## 2019-10-15 ENCOUNTER — PRE VISIT (OUTPATIENT)
Dept: RADIATION ONCOLOGY | Facility: CLINIC | Age: 69
End: 2019-10-15

## 2019-10-15 NOTE — TELEPHONE ENCOUNTER
"Date: 10/15/2019   Age: 69 year old  Ethnicity:     Sex: female  : 1950   Lives In[place]          Diagnosis: Non Small Cell Lung Cancer favoring Adenocarcinoma, ALK/PDL 1/EGFR/BRAF/ROS 1 all negative      Prior radiation therapy:   Site Treated: left temporal lesion and left cerebellum surgical cavity  Facility: [facility]  Dates: 18  Dose: at     Site Treated: at  Facility: at  Dates: at  Dose: at     Prior chemotherapy:   See Below        Pain at time of consult, management plan if yes:  Does pt have a living will:  Is Pt Pregnant:  Does Pt have any implanted cardiac devices:     RN time with patient:   Doctors to \"cc\":  Dr. True Sauceda, Dr. Butt, Dr. Radha Reddy,      Pt was educated on Gamma Knife Procedure            Review Since Diagnosis:    Beginning 2018; one day woke up with headache, sore neck and right ear sore, progressed to bilateral hand weakness and gait instability    Treated with antibiotics and Meclizine for possible ear infection, no improvement    18; to ER at [facility], as now nausea and vomiting also    7/10/18; left upper lobe lung bronchial lavage, washing and brushing, showed positive for non small cell carcinoma.  Lesion (7.7 x 5.8 x 10.1 cm) left upper lobe biopsy showed non small cell carcinoma, favor adenocarcinoma, pending further testing     7/10/18; Brain MRI, 3.4 x 3.1 x 2.7 cm mass left cerebellum                                    1 cm mass periphery of left cerebellum                                    0.4 cm mass inferior left temporal    7/10/18; CT Chest/Abd/Pelvis, mass left upper lobe lung, at least four lesions left lower lobe, mediastinal and hilar adenopathy,     7/10/18; pt saw Dr. Butt, started dexamethasone and feeling better.  Discussed with Dr. Masters felt cerebellum lesion too large for Gamma Knife     18; pt saw Dr. Maddox, now possibly crani for left cerebellum lesion and then Gamma Knife, waiting for all of " molecular markers to come back    7/17/18; crani for left posterior fossa tumor resection by Dr. True Sauceda, showed metastatic adenocarcinoma, consistent with lung     7/18/18; Brain MRI, left posterior fossa crani with tumor resection of both the large and small satellite lesion    8/22/18; Gamma Knife    9/18/18; started Carboplatin/Alimta     10/31/18; pt saw Dr. Sauceda, MRI prior to showed no recurrence     Alimta maintenance     1/16/19; saw Dr. Sauceda, MRI prior to visit showed no recurrence     4/3/19; saw Dr. Sauceda, MRI prior to visit showed no recurrence    5/19; initiated Nivolumab    9/10/19; pt having cough and enlarging left supraclavicular lymph node, CT showed enlargement of left upper lobe mass,    Put on prednisone for the cough    9/20/19; CT/PET, decrease in left upper lobe mass but nodular component at posterior aspect of the mass increased, decrease in number and uptake of cervical and intrathoracic lymph nodes    9/24/19; pt saw Dr. Reddy, due for cycle #5 of Nivolumab, continue with this every 4 weeks    10/9/19; Brain MRI, 0.2 cm development of abnormal focus of enhancement left cerebellum medial to resection cacity    10/9/19; pt saw Dr. Sauceda, pt having slight tremors which are new and continues with intermittent headaches.  New met on MRI, refer to Dr. Masters    Chief Complaint: at consult

## 2019-10-16 ENCOUNTER — OFFICE VISIT (OUTPATIENT)
Dept: RADIATION ONCOLOGY | Facility: CLINIC | Age: 69
End: 2019-10-16
Attending: RADIOLOGY
Payer: COMMERCIAL

## 2019-10-16 VITALS
RESPIRATION RATE: 16 BRPM | HEIGHT: 64 IN | DIASTOLIC BLOOD PRESSURE: 64 MMHG | BODY MASS INDEX: 21.34 KG/M2 | WEIGHT: 125 LBS | SYSTOLIC BLOOD PRESSURE: 115 MMHG | OXYGEN SATURATION: 97 % | HEART RATE: 93 BPM

## 2019-10-16 DIAGNOSIS — C79.31 METASTASIS TO BRAIN (H): Primary | ICD-10-CM

## 2019-10-16 LAB
ANION GAP SERPL CALCULATED.3IONS-SCNC: 8 MMOL/L (ref 3–14)
BASOPHILS # BLD AUTO: 0 10E9/L (ref 0–0.2)
BASOPHILS NFR BLD AUTO: 0.4 %
CHLORIDE SERPL-SCNC: 104 MMOL/L (ref 94–109)
CO2 SERPL-SCNC: 24 MMOL/L (ref 20–32)
CREAT SERPL-MCNC: 0.79 MG/DL (ref 0.52–1.04)
DIFFERENTIAL METHOD BLD: ABNORMAL
EOSINOPHIL # BLD AUTO: 0.1 10E9/L (ref 0–0.7)
EOSINOPHIL NFR BLD AUTO: 1.7 %
ERYTHROCYTE [DISTWIDTH] IN BLOOD BY AUTOMATED COUNT: 20.8 % (ref 10–15)
GFR SERPL CREATININE-BSD FRML MDRD: 78 ML/MIN/{1.73_M2}
HCT VFR BLD AUTO: 36.4 % (ref 35–47)
HGB BLD-MCNC: 10.9 G/DL (ref 11.7–15.7)
IMM GRANULOCYTES # BLD: 0.1 10E9/L (ref 0–0.4)
IMM GRANULOCYTES NFR BLD: 1 %
LYMPHOCYTES # BLD AUTO: 0.8 10E9/L (ref 0.8–5.3)
LYMPHOCYTES NFR BLD AUTO: 16.9 %
MCH RBC QN AUTO: 24.1 PG (ref 26.5–33)
MCHC RBC AUTO-ENTMCNC: 29.9 G/DL (ref 31.5–36.5)
MCV RBC AUTO: 81 FL (ref 78–100)
MONOCYTES # BLD AUTO: 0.5 10E9/L (ref 0–1.3)
MONOCYTES NFR BLD AUTO: 9.4 %
NEUTROPHILS # BLD AUTO: 3.4 10E9/L (ref 1.6–8.3)
NEUTROPHILS NFR BLD AUTO: 70.6 %
NRBC # BLD AUTO: 0 10*3/UL
NRBC BLD AUTO-RTO: 0 /100
PLATELET # BLD AUTO: 235 10E9/L (ref 150–450)
POTASSIUM SERPL-SCNC: 3.4 MMOL/L (ref 3.4–5.3)
RBC # BLD AUTO: 4.52 10E12/L (ref 3.8–5.2)
SODIUM SERPL-SCNC: 137 MMOL/L (ref 133–144)
WBC # BLD AUTO: 4.8 10E9/L (ref 4–11)

## 2019-10-16 PROCEDURE — 82565 ASSAY OF CREATININE: CPT | Performed by: NEUROLOGICAL SURGERY

## 2019-10-16 PROCEDURE — G0463 HOSPITAL OUTPT CLINIC VISIT: HCPCS | Performed by: RADIOLOGY

## 2019-10-16 PROCEDURE — 80051 ELECTROLYTE PANEL: CPT | Performed by: NEUROLOGICAL SURGERY

## 2019-10-16 PROCEDURE — 36415 COLL VENOUS BLD VENIPUNCTURE: CPT | Performed by: NEUROLOGICAL SURGERY

## 2019-10-16 PROCEDURE — 85025 COMPLETE CBC W/AUTO DIFF WBC: CPT | Performed by: NEUROLOGICAL SURGERY

## 2019-10-16 RX ORDER — FOLIC ACID 1 MG/1
1 TABLET ORAL DAILY
COMMUNITY

## 2019-10-16 RX ORDER — CODEINE PHOSPHATE AND GUAIFENESIN 10; 100 MG/5ML; MG/5ML
1-2 SOLUTION ORAL EVERY 4 HOURS PRN
COMMUNITY

## 2019-10-16 RX ORDER — OMEPRAZOLE 20 MG/1
20 TABLET, DELAYED RELEASE ORAL DAILY
COMMUNITY

## 2019-10-16 RX ORDER — LORAZEPAM 0.5 MG/1
1 TABLET ORAL DAILY
COMMUNITY

## 2019-10-16 ASSESSMENT — ENCOUNTER SYMPTOMS
DOUBLE VISION: 0
HEADACHES: 1
CONSTIPATION: 0
SEIZURES: 0
BLOOD IN STOOL: 0
WEIGHT LOSS: 0
FEVER: 0
NAUSEA: 0
COUGH: 1
DIARRHEA: 0
DIZZINESS: 0
SHORTNESS OF BREATH: 1
PHOTOPHOBIA: 0
VOMITING: 0
DEPRESSION: 1
BLURRED VISION: 0
MUSCULOSKELETAL NEGATIVE: 1

## 2019-10-16 ASSESSMENT — MIFFLIN-ST. JEOR: SCORE: 1087

## 2019-10-16 NOTE — LETTER
"10/16/2019       RE: Saritha Church  [address]  [place] [id number]     Dear Colleague,    Thank you for referring your patient, Saritha Church, to the [facility] [place], RADIATION ONCOLOGY. Please see a copy of my visit note below.      HPI    Date: 10/15/2019   Age: 69 year old  Ethnicity:     Sex: female  : 1950   Lives In: [place]          Diagnosis: Non Small Cell Lung Cancer favoring Adenocarcinoma, ALK/PDL 1/EGFR/BRAF/ROS 1 all negative      Prior radiation therapy:   Site Treated: left temporal lesion and left cerebellum surgical cavity  Facility: [facility]  Dates: 18  Dose: at       Prior chemotherapy:   See Below        Pain at time of consult, management plan if yes: pt denies pain at time of consult  Does pt have a living will: pt does not have  Is Pt Pregnant: age 69  Does Pt have any implanted cardiac devices: pt has no implanted cardiac device     RN time with patient:  50 minutes and has had Gamma Knife, refreshed on procedure  Doctors to \"cc\":  Dr. True Sauceda, Dr. Butt, Dr. Radha Reddy,      Pt was educated on Gamma Knife Procedure ; yes and has had           Review Since Diagnosis:    Beginning 2018; one day woke up with headache, sore neck and right ear sore, progressed to bilateral hand weakness and gait instability    Treated with antibiotics and Meclizine for possible ear infection, no improvement    18; to ER at [facility], as now nausea and vomiting also    7/10/18; left upper lobe lung bronchial lavage, washing and brushing, showed positive for non small cell carcinoma.  Lesion (7.7 x 5.8 x 10.1 cm) left upper lobe biopsy showed non small cell carcinoma, favor adenocarcinoma, pending further testing     7/10/18; Brain MRI, 3.4 x 3.1 x 2.7 cm mass left cerebellum                                    1 cm mass periphery of left cerebellum                                    0.4 cm mass inferior left temporal    7/10/18; CT Chest/Abd/Pelvis, mass " left upper lobe lung, at least four lesions left lower lobe, mediastinal and hilar adenopathy,     7/10/18; pt saw Dr. Butt, started dexamethasone and feeling better.  Discussed with Dr. Masters felt cerebellum lesion too large for Gamma Knife     7/11/18; pt saw Dr. Maddox, now possibly crani for left cerebellum lesion and then Gamma Knife, waiting for all of molecular markers to come back    7/17/18; crani for left posterior fossa tumor resection by Dr. True Sauceda, showed metastatic adenocarcinoma, consistent with lung     7/18/18; Brain MRI, left posterior fossa crani with tumor resection of both the large and small satellite lesion    8/22/18; Gamma Knife    9/18/18; started Carboplatin/Alimta     10/31/18; pt saw Dr. Sauceda, MRI prior to showed no recurrence     Alimta maintenance     1/16/19; saw Dr. Sauceda, MRI prior to visit showed no recurrence     4/3/19; saw Dr. Sauceda, MRI prior to visit showed no recurrence    5/19; initiated Nivolumab    9/10/19; pt having cough and enlarging left supraclavicular lymph node, CT showed enlargement of left upper lobe mass,    Put on prednisone for the cough, pt off of that now and just on cough syrup with codeine    9/20/19; CT/PET, decrease in left upper lobe mass but nodular component at posterior aspect of the mass increased, decrease in number and uptake of cervical and intrathoracic lymph nodes    9/24/19; pt saw Dr. Reddy, due for cycle #5 of Nivolumab, continue with this every 4 weeks    10/9/19; Brain MRI, 0.2 cm development of abnormal focus of enhancement left cerebellum medial to resection cacity    10/9/19; pt saw Dr. Sauceda, pt having slight tremors which are new and continues with intermittent headaches.  New met on MRI, refer to Dr. Masters    10/22/19; due for next Nivolumab    Chief Complaint: slight tremor of hands has started and pt has been told it may be from the Nivolumab, pt states she does get headaches off and on, not bad and not  daily, no balance or dizzy issues   tReview of Systems   Constitutional: Positive for malaise/fatigue. Negative for fever and weight loss.   HENT: Negative for hearing loss.    Eyes: Negative for blurred vision, double vision and photophobia.   Respiratory: Positive for cough and shortness of breath.    Cardiovascular: Negative for chest pain and leg swelling.   Gastrointestinal: Negative for blood in stool, constipation, diarrhea, nausea and vomiting.   Genitourinary: Negative.    Musculoskeletal: Negative.    Neurological: Positive for headaches. Negative for dizziness and seizures.   Psychiatric/Behavioral: Positive for depression.        On depression med                 Service Date: 10/16/2019      DIAGNOSIS:  Nonsmall cell lung cancer (adenocarcinoma) with a new left cerebellar brain metastasis in a patient status post prior craniotomy and gamma knife radiosurgery for other brain metastases.      HISTORY OF PRESENT ILLNESS:  The patient is a 69-year-old white female with a 50-pack-year smoking history who was well until 07/2018 when she developed headaches and gait instability.  Brain MRI 07/10/2018 showed 3 brain metastases (3.4 cm left medial cerebellum, 1 cm left lateral cerebellum and 0.4 cm left inferior temporal).  Chest CT 07/10/2018 showed a left upper lung mass with mediastinal and hilar adenopathy.  Bronchoscopic biopsy of the lung mass confirmed nonsmall cell lung cancer (adenocarcinoma) which was negative for mutations.  On 07/17/2018, she underwent a craniotomy and surgical resection of the 2 metastases in the left cerebellum.  On 08/22/2018 she underwent gamma knife radiosurgery for the left cerebellar surgical cavity and also the left inferior temporal brain metastasis.  She was then treated with 4 cycles of carboplatin and Alimta chemotherapy followed by maintenance Alimta.  Serial brain MRIs were stable.  In 05/2019, the Alimta was discontinued and she was started on nivolumab.  She has now  received 5 cycles on an every 4-week schedule.  Routine followup brain MRI 10/09/2019 showed a 0.2 cm metastasis in the medial aspect of the left cerebellum, separate from the surgical cavity.  She was evaluated by Dr. Sauceda on 10/09/2019 and referred for consideration of gamma knife radiosurgery.  The patient denies any symptoms at this time other than minimal hand tremor, which she attributes to the nivolumab.      PAST MEDICAL HISTORY:   1.  Tobacco abuse.   2.  Status post bilateral cataract extraction.   3.  Status post  section.   4.  Status post bilateral tubal ligation.   5.  Status post hysterectomy.      MEDICATIONS:   1.  Nivolumab every 4 weeks, next scheduled 10/29/2019   2.  Folic acid.   3.  Robitussin with codeine.   4.  Lorazepam.   5.  Omeprazole.   6.  Zoloft.      ALLERGIES:  Shellfish.      REVIEW OF SYSTEMS:  All systems were reviewed and found to be otherwise negative.      SOCIAL HISTORY:  The patient is  and has 2 children.  She lives in [place], [place].  She is accompanied to today's appointment by her  and daughter.      HABITS:  She has smoked 1/2 pack of cigarettes to 1 pack of cigarettes per day for 50 years.  She will occasionally drink alcohol.      FAMILY HISTORY:  Her father had lung cancer, her brother had colon cancer, her maternal grandmother had ovarian cancer and a maternal aunt had breast cancer.      PHYSICAL EXAMINATION:   GENERAL:  Well-developed, well-nourished white female in no acute distress, alert and oriented, pleasant and cooperative.   VITAL SIGNS:  Afebrile, vital signs stable.  Height 5 feet 4 inches, weight 125, blood pressure 115/64, heart rate 90, respirations 12, oxygen saturation on room air 97%.  BMI 21.5.   HEENT:  Shows the left occipital craniotomy scar is well-healed.   NECK:  Supple without adenopathy.   LUNGS:  Clear to auscultation.   HEART:  Regular rate and rhythm.   NEUROLOGIC:  Nonfocal.      IMPRESSION:  Metastatic  nonsmall cell lung cancer (adenocarcinoma) with a new left cerebellar brain metastasis in a patient status post craniotomy and prior gamma knife radiosurgery.  The new brain metastasis to 0.3 cm in the left medial cerebellum.      RECOMMENDATIONS:  The diagnosis, prognosis and therapeutic options were reviewed with the patient and her family.  The brain MRI images were reviewed with them and the tumor was pointed out.  I do not recommend whole brain radiation therapy.  I do recommend gamma knife radiosurgery for the new brain metastasis.  The rationale for this treatment as well as the procedures, risks, benefits and potential side effects were explained.  The patient appears to understand and agrees to proceed.  The procedure is scheduled for 10/23/2019 at the [facility].  I also spoke with Dr. Reddy who will change the date of the next nivolumab from 10/22/2019 to 10/29/2019.      Thank you very much for asking me to see this pleasant woman and to share in her evaluation once again.      REINALDO MARTINEZ MD      cc:   Radha Rdedy MD   [facility]   [address]   [place]  [address]      True Sauceda MD   [facility]   [address]   [place]  [address]      Edward Butt MD   [facility]   [address] [id number]      Tumor Registry                    D: 10/16/2019   T: 10/16/2019   MT: brennan      Name:     ROSIO GARCIA   MRN:      [id number]        Account:      [id number]   :      1950           Service Date: 10/16/2019      Document: [id number]

## 2019-10-16 NOTE — PROGRESS NOTES
"  HPI    Date: 10/15/2019   Age: 69 year old  Ethnicity:     Sex: female  : 1950   L[place] In[place]          Diagnosis: Non Small Cell Lung Cancer favoring Adenocarcinoma, ALK/PDL 1/EGFR/BRAF/ROS 1 all negative      Prior radiation therapy:   Site Treated: left temporal lesion and left cerebellum surgical cavity  Facility: [facility]  Dates: 18  Dose: at       Prior chemotherapy:   See Below        Pain at time of consult, management plan if yes: pt denies pain at time of consult  Does pt have a living will: pt does not have  Is Pt Pregnant: age 69  Does Pt have any implanted cardiac devices: pt has no implanted cardiac device     RN time with patient: 50 minutes and has had Gamma Knife, refreshed on procedure  Doctors to \"cc\":  Dr. True Sauceda, Dr. Butt, Dr. Radha Reddy,      Pt was educated on Gamma Knife Procedure ; yes and has had           Review Since Diagnosis:    Beginning 2018; one day woke up with headache, sore neck and right ear sore, progressed to bilateral hand weakness and gait instability    Treated with antibiotics and Meclizine for possible ear infection, no improvement    18; to ER at [facility], as now nausea and vomiting also    7/10/18; left upper lobe lung bronchial lavage, washing and brushing, showed positive for non small cell carcinoma.  Lesion (7.7 x 5.8 x 10.1 cm) left upper lobe biopsy showed non small cell carcinoma, favor adenocarcinoma, pending further testing     7/10/18; Brain MRI, 3.4 x 3.1 x 2.7 cm mass left cerebellum                                    1 cm mass periphery of left cerebellum                                    0.4 cm mass inferior left temporal    7/10/18; CT Chest/Abd/Pelvis, mass left upper lobe lung, at least four lesions left lower lobe, mediastinal and hilar adenopathy,     7/10/18; pt saw Dr. Butt, started dexamethasone and feeling better.  Discussed with Dr. Masters felt cerebellum lesion too large for Gamma " Knife     7/11/18; pt saw Dr. Maddox, now possibly crani for left cerebellum lesion and then Gamma Knife, waiting for all of molecular markers to come back    7/17/18; crani for left posterior fossa tumor resection by Dr. True Sauceda, showed metastatic adenocarcinoma, consistent with lung     7/18/18; Brain MRI, left posterior fossa crani with tumor resection of both the large and small satellite lesion    8/22/18; Gamma Knife    9/18/18; started Carboplatin/Alimta     10/31/18; pt saw Dr. Sauceda, MRI prior to showed no recurrence     Alimta maintenance     1/16/19; saw Dr. Sauceda, MRI prior to visit showed no recurrence     4/3/19; saw Dr. Sauceda, MRI prior to visit showed no recurrence    5/19; initiated Nivolumab    9/10/19; pt having cough and enlarging left supraclavicular lymph node, CT showed enlargement of left upper lobe mass,    Put on prednisone for the cough, pt off of that now and just on cough syrup with codeine    9/20/19; CT/PET, decrease in left upper lobe mass but nodular component at posterior aspect of the mass increased, decrease in number and uptake of cervical and intrathoracic lymph nodes    9/24/19; pt saw Dr. Reddy, due for cycle #5 of Nivolumab, continue with this every 4 weeks    10/9/19; Brain MRI, 0.2 cm development of abnormal focus of enhancement left cerebellum medial to resection cacity    10/9/19; pt saw Dr. Sauceda, pt having slight tremors which are new and continues with intermittent headaches.  New met on MRI, refer to Dr. Masters    10/22/19; due for next Nivolumab    Chief Complaint: slight tremor of hands has started and pt has been told it may be from the Nivolumab, pt states she does get headaches off and on, not bad and not daily, no balance or dizzy issues   tReview of Systems   Constitutional: Positive for malaise/fatigue. Negative for fever and weight loss.   HENT: Negative for hearing loss.    Eyes: Negative for blurred vision, double vision and photophobia.    Respiratory: Positive for cough and shortness of breath.    Cardiovascular: Negative for chest pain and leg swelling.   Gastrointestinal: Negative for blood in stool, constipation, diarrhea, nausea and vomiting.   Genitourinary: Negative.    Musculoskeletal: Negative.    Neurological: Positive for headaches. Negative for dizziness and seizures.   Psychiatric/Behavioral: Positive for depression.        On depression med

## 2019-10-16 NOTE — PROGRESS NOTES
Service Date: 10/16/2019      DIAGNOSIS:  Nonsmall cell lung cancer (adenocarcinoma) with a new left cerebellar brain metastasis in a patient status post prior craniotomy and gamma knife radiosurgery for other brain metastases.      HISTORY OF PRESENT ILLNESS:  The patient is a 69-year-old white female with a 50-pack-year smoking history who was well until 2018 when she developed headaches and gait instability.  Brain MRI 07/10/2018 showed 3 brain metastases (3.4 cm left medial cerebellum, 1 cm left lateral cerebellum and 0.4 cm left inferior temporal).  Chest CT 07/10/2018 showed a left upper lung mass with mediastinal and hilar adenopathy.  Bronchoscopic biopsy of the lung mass confirmed nonsmall cell lung cancer (adenocarcinoma) which was negative for mutations.  On 2018, she underwent a craniotomy and surgical resection of the 2 metastases in the left cerebellum.  On 2018 she underwent gamma knife radiosurgery for the left cerebellar surgical cavity and also the left inferior temporal brain metastasis.  She was then treated with 4 cycles of carboplatin and Alimta chemotherapy followed by maintenance Alimta.  Serial brain MRIs were stable.  In 2019, the Alimta was discontinued and she was started on nivolumab.  She has now received 5 cycles on an every 4-week schedule.  Routine followup brain MRI 10/09/2019 showed a 0.2 cm metastasis in the medial aspect of the left cerebellum, separate from the surgical cavity.  She was evaluated by Dr. Sauceda on 10/09/2019 and referred for consideration of gamma knife radiosurgery.  The patient denies any symptoms at this time other than minimal hand tremor, which she attributes to the nivolumab.      PAST MEDICAL HISTORY:   1.  Tobacco abuse.   2.  Status post bilateral cataract extraction.   3.  Status post  section.   4.  Status post bilateral tubal ligation.   5.  Status post hysterectomy.      MEDICATIONS:   1.  Nivolumab every 4 weeks, next  scheduled 10/29/2019   2.  Folic acid.   3.  Robitussin with codeine.   4.  Lorazepam.   5.  Omeprazole.   6.  Zoloft.      ALLERGIES:  Shellfish.      REVIEW OF SYSTEMS:  All systems were reviewed and found to be otherwise negative.      SOCIAL HISTORY:  The patient is  and has 2 children.  She lives in [place], [place].  She is accompanied to today's appointment by her  and daughter.      HABITS:  She has smoked 1/2 pack of cigarettes to 1 pack of cigarettes per day for 50 years.  She will occasionally drink alcohol.      FAMILY HISTORY:  Her father had lung cancer, her brother had colon cancer, her maternal grandmother had ovarian cancer and a maternal aunt had breast cancer.      PHYSICAL EXAMINATION:   GENERAL:  Well-developed, well-nourished white female in no acute distress, alert and oriented, pleasant and cooperative.   VITAL SIGNS:  Afebrile, vital signs stable.  Height 5 feet 4 inches, weight 125, blood pressure 115/64, heart rate 90, respirations 12, oxygen saturation on room air 97%.  BMI 21.5.   HEENT:  Shows the left occipital craniotomy scar is well-healed.   NECK:  Supple without adenopathy.   LUNGS:  Clear to auscultation.   HEART:  Regular rate and rhythm.   NEUROLOGIC:  Nonfocal.      IMPRESSION:  Metastatic nonsmall cell lung cancer (adenocarcinoma) with a new left cerebellar brain metastasis in a patient status post craniotomy and prior gamma knife radiosurgery.  The new brain metastasis to 0.3 cm in the left medial cerebellum.      RECOMMENDATIONS:  The diagnosis, prognosis and therapeutic options were reviewed with the patient and her family.  The brain MRI images were reviewed with them and the tumor was pointed out.  I do not recommend whole brain radiation therapy.  I do recommend gamma knife radiosurgery for the new brain metastasis.  The rationale for this treatment as well as the procedures, risks, benefits and potential side effects were explained.  The patient appears  to understand and agrees to proceed.  The procedure is scheduled for 10/23/2019 at the Gamma Knife [facility] at the [facility].  I also spoke with Dr. Reddy who will change the date of the next nivolumab from 10/22/2019 to 10/29/2019.      Thank you very much for asking me to see this pleasant woman and to share in her evaluation once again.      cc:   Radha Reddy MD   [facility]   [address], [address]   [place]  [address]      True Sauceda MD   [facility]   [address]   [place]  [address]      Edward Butt MD   [facility]   [address] [place]  [address]      Tumor Registry         REINALDO MARTINEZ MD             D: 10/16/2019   T: 10/16/2019   MT: brennan      Name:     ROSIO GARCAI   MRN:      [id number]        Account:      [id number]   :      1950           Service Date: 10/16/2019      Document: [id number]

## 2019-10-23 ENCOUNTER — HOSPITAL ENCOUNTER (OUTPATIENT)
Dept: MRI IMAGING | Facility: CLINIC | Age: 69
Discharge: HOME | End: 2019-10-23
Attending: NEUROLOGICAL SURGERY | Admitting: NEUROLOGICAL SURGERY
Payer: COMMERCIAL

## 2019-10-23 ENCOUNTER — HOSPITAL ENCOUNTER (OUTPATIENT)
Dept: MEDSURG UNIT | Facility: CLINIC | Age: 69
End: 2019-10-23
Attending: NEUROLOGICAL SURGERY
Payer: COMMERCIAL

## 2019-10-23 ENCOUNTER — OFFICE VISIT (OUTPATIENT)
Dept: RADIATION ONCOLOGY | Facility: CLINIC | Age: 69
End: 2019-10-23
Attending: RADIOLOGY
Payer: COMMERCIAL

## 2019-10-23 VITALS
HEART RATE: 100 BPM | RESPIRATION RATE: 16 BRPM | DIASTOLIC BLOOD PRESSURE: 69 MMHG | SYSTOLIC BLOOD PRESSURE: 101 MMHG | OXYGEN SATURATION: 98 %

## 2019-10-23 DIAGNOSIS — C79.31 METASTASIS TO BRAIN (H): ICD-10-CM

## 2019-10-23 DIAGNOSIS — C79.31 METASTASIS TO BRAIN (H): Primary | ICD-10-CM

## 2019-10-23 PROCEDURE — 25000132 ZZH RX MED GY IP 250 OP 250 PS 637: Mod: ZF | Performed by: NEUROLOGICAL SURGERY

## 2019-10-23 PROCEDURE — 77300 RADIATION THERAPY DOSE PLAN: CPT | Performed by: RADIOLOGY

## 2019-10-23 PROCEDURE — 40000172 ZZH STATISTIC PROCEDURE PREP ONLY

## 2019-10-23 PROCEDURE — 77295 3-D RADIOTHERAPY PLAN: CPT | Performed by: RADIOLOGY

## 2019-10-23 PROCEDURE — 25000125 ZZHC RX 250: Mod: ZF | Performed by: NEUROLOGICAL SURGERY

## 2019-10-23 PROCEDURE — 77370 RADIATION PHYSICS CONSULT: CPT | Performed by: RADIOLOGY

## 2019-10-23 PROCEDURE — 77371 SRS MULTISOURCE: CPT | Performed by: RADIOLOGY

## 2019-10-23 PROCEDURE — A9585 GADOBUTROL INJECTION: HCPCS | Performed by: NEUROLOGICAL SURGERY

## 2019-10-23 PROCEDURE — 77334 RADIATION TREATMENT AID(S): CPT | Performed by: RADIOLOGY

## 2019-10-23 PROCEDURE — 25500064 ZZH RX 255 OP 636: Performed by: NEUROLOGICAL SURGERY

## 2019-10-23 PROCEDURE — 70552 MRI BRAIN STEM W/DYE: CPT

## 2019-10-23 PROCEDURE — 25000128 H RX IP 250 OP 636: Mod: ZF | Performed by: NEUROLOGICAL SURGERY

## 2019-10-23 RX ORDER — LORAZEPAM 0.5 MG/1
.5-1 TABLET ORAL
Status: DISCONTINUED | OUTPATIENT
Start: 2019-10-23 | End: 2019-10-23 | Stop reason: HOSPADM

## 2019-10-23 RX ORDER — LIDOCAINE 40 MG/G
1 CREAM TOPICAL SEE ADMIN INSTRUCTIONS
Status: COMPLETED | OUTPATIENT
Start: 2019-10-23 | End: 2019-10-23

## 2019-10-23 RX ORDER — HEPARIN SODIUM,PORCINE 10 UNIT/ML
5 VIAL (ML) INTRAVENOUS
Status: COMPLETED | OUTPATIENT
Start: 2019-10-23 | End: 2019-10-23

## 2019-10-23 RX ORDER — GADOBUTROL 604.72 MG/ML
7.5 INJECTION INTRAVENOUS ONCE
Status: COMPLETED | OUTPATIENT
Start: 2019-10-23 | End: 2019-10-23

## 2019-10-23 RX ORDER — ONDANSETRON 2 MG/ML
4 INJECTION INTRAMUSCULAR; INTRAVENOUS
Status: DISCONTINUED | OUTPATIENT
Start: 2019-10-23 | End: 2019-10-23 | Stop reason: HOSPADM

## 2019-10-23 RX ORDER — ONDANSETRON 4 MG/1
8 TABLET, ORALLY DISINTEGRATING ORAL EVERY 6 HOURS PRN
Status: DISCONTINUED | OUTPATIENT
Start: 2019-10-23 | End: 2019-10-23 | Stop reason: HOSPADM

## 2019-10-23 RX ORDER — HYDROCODONE BITARTRATE AND ACETAMINOPHEN 5; 325 MG/1; MG/1
1-2 TABLET ORAL EVERY 6 HOURS PRN
Status: DISCONTINUED | OUTPATIENT
Start: 2019-10-23 | End: 2019-10-23 | Stop reason: HOSPADM

## 2019-10-23 RX ORDER — LIDOCAINE/PRILOCAINE 2.5 %-2.5%
CREAM (GRAM) TOPICAL
COMMUNITY

## 2019-10-23 RX ORDER — LORAZEPAM 0.5 MG/1
.5-2 TABLET ORAL
Status: COMPLETED | OUTPATIENT
Start: 2019-10-23 | End: 2019-10-23

## 2019-10-23 RX ORDER — ACETAMINOPHEN 325 MG/1
650 TABLET ORAL EVERY 4 HOURS PRN
Status: DISCONTINUED | OUTPATIENT
Start: 2019-10-23 | End: 2019-10-23 | Stop reason: HOSPADM

## 2019-10-23 RX ORDER — HEPARIN SODIUM (PORCINE) LOCK FLUSH IV SOLN 100 UNIT/ML 100 UNIT/ML
500 SOLUTION INTRAVENOUS SEE ADMIN INSTRUCTIONS
Status: COMPLETED | OUTPATIENT
Start: 2019-10-23 | End: 2019-10-23

## 2019-10-23 RX ADMIN — Medication 5 ML: at 07:20

## 2019-10-23 RX ADMIN — Medication 5 ML: at 05:39

## 2019-10-23 RX ADMIN — Medication 500 UNITS: at 09:57

## 2019-10-23 RX ADMIN — LIDOCAINE 1 G: 40 CREAM TOPICAL at 05:39

## 2019-10-23 RX ADMIN — BUPIVACAINE HYDROCHLORIDE 30 ML: 7.5 INJECTION, SOLUTION EPIDURAL; RETROBULBAR at 07:15

## 2019-10-23 RX ADMIN — GADOBUTROL 5 ML: 604.72 INJECTION INTRAVENOUS at 07:48

## 2019-10-23 RX ADMIN — LORAZEPAM 2 MG: 0.5 TABLET ORAL at 05:39

## 2019-10-23 ASSESSMENT — PAIN SCALES - GENERAL
PAINLEVEL: NO PAIN (0)
PAINLEVEL: NO PAIN (0)

## 2019-10-23 NOTE — PROCEDURES
PREOPERATIVE DIAGNOSIS:  Metastatic non-small cell lung cancer    POSTOPERATIVE DIAGNOSIS:  Same    OPERATIVE PROCEDURES:  1.  Gamma Knife radiosurgery to single left cerebellar lesion  2.  Application of stereotactic head frame for stereotactic radiosurgery    SURGEON:  Suleman Newberry MD    ASSISTANT:  None    ANESTHESIA:  Local    INDICATIONS FOR THE PROCEDURE:  Ms. Saritha Church is a 68 y/o female with a history of metastatic non-small cell lung cancer.  She has a history of left posterior fossa craniotomy with Dr. Sauceda in July 2018.  This was followed by Gamma Knife to the resection cavity and one additional metastasis in August 2018.  Recent imaging has now shown an additional metastasis in the left cerebellum.  Gamma Knife stereotactic radiosurgery was recommended to treat this lesion.    DESCRIPTION OF THE PROCEDURE:  On the morning of the procedure, the patient was brought to the Gamma Knife radiosurgery area.  A pre-procedure pause was performed to confirm the identity and date of birth of the patient, as well as the procedure site.  The scalp was prepped with betadine and then anesthetized with a combination of marcaine, lidocaine, and epinephrine.  The [place]ell G-frame was applied and the pins were fixed to hand tightness.  The patient tolerated the application of the frame well.    The patient was taken to the MRI scanner where an MRI with gadolinium contrast was obtained.  The patient returned from MRI.  The lesion was outlined on the planning software and we made a conformal dose outline for this lesion.  Dr. Masters selected the radiation dose for the lesion.  Measurements were taken,  steps performed, and the patient was then brought into the treatment room.  Radiation was given according to the prescription.    The patient was then taken out of the unit and out of the treatment room.  The stereotactic frame was removed and a dressing was applied.  After observation, the  patient was discharged.  Follow up arrangements will be made for the patient.    I attest that I was present for the entirety of the case, including pre-procedure assessment, stereotactic head frame placement, treatment planning, treatment delivery, as well as frame removal at the end of the treatment.

## 2019-10-23 NOTE — LETTER
10/23/2019       RE: Saritha Church  [address] [place] [id number]     Dear Colleague,    Thank you for referring your patient, Saritha Church, to the [facility] [place], RADIATION ONCOLOGY. Please see a copy of my visit note below.    GAMMA KNIFE RADIOSURGERY TREATMENT SUMMARY  DEPARTMENT OF RADIATION ONCOLOGY    Name: Saritha Church                                        : 1950                 Medical Record #: [id number]                       Diagnosis: Non Small Cell Lung Cancer                      Date of Treatment: 10/23/2019                                       Referring Physicians:  Radha Reddy, True Sauceda, Edward Butt, Tumor Registry     BRIEF CLINICAL HISTORY:                                                                                                 The patient is a 69-year-old white female with a 50-pack-year smoking history who was well until 2018 when she developed headaches and gait instability.  Brain MRI 07/10/2018 showed 3 brain metastases (3.4 cm left medial cerebellum, 1 cm left lateral cerebellum and 0.4 cm left inferior temporal).  Chest CT 07/10/2018 showed a left upper lung mass with mediastinal and hilar adenopathy.  Bronchoscopic biopsy of the lung mass confirmed nonsmall cell lung cancer (adenocarcinoma) which was negative for mutations.  On 2018, she underwent a craniotomy and surgical resection of the 2 metastases in the left cerebellum.  On 2018 she underwent gamma knife radiosurgery for the left cerebellar surgical cavity and also the left inferior temporal brain metastasis.  She was then treated with 4 cycles of carboplatin and Alimta chemotherapy followed by maintenance Alimta.  Serial brain MRIs were stable.  In 2019, the Alimta was discontinued and she was started on nivolumab.  She has now received 5 cycles on an every 4-week schedule.  Routine followup brain MRI 10/09/2019 showed a 0.2 cm metastasis in the medial aspect of the left  cerebellum, separate from the surgical cavity.  She was evaluated by Dr. Sauceda on 10/09/2019 and referred for consideration of gamma knife radiosurgery.  The patient denies any symptoms at this time other than minimal hand tremor, which she attributes to the nivolumab.   TECHNICAL SUMMARY        Collimators    # Site Energy Min Tumor Dose (Gy) IDL (%) # Size (mm) Treatment Vol. (cc)   1 Left Medial Cerebellum [place] 60 18 70 1 4 0.05     DESCRIPTION OF PROCEDURE:                                                                              On 10/23/2019 the patient was brought to the Gamma Knife suite at [facility].  After sedation and topical anesthetic, the head frame was put on by Dr. Suleman Newberry.  The patient was then taken to the department of Radiology where a stereotactic brain MRI was performed.  The patient was admitted to the [facility] where she rested comfortably while treatment planning was completed.  The [employer] Gamma Plan software was used to create a highly conformal dose distribution using the number and size collimators detailed above.  The patient was brought to the Gamma Knife suite.  The treatment was delivered using the [place] Gamma Knife ICON without complication.  The head frame was removed and the patient was discharged home in stable condition.  FOLLOW-UP PLANS:                                                                                                        The Gamma Knife Nurse Coordinator will call the patient tomorrow for short-term follow up.  She should have a brain MRI in 2 months and every 3 months thereafter.  The patient will also follow-up with (jojo Reddy and Komal.  I would be happy to see her anytime.    Dante Masters MD, [facility], [facility]

## 2019-10-23 NOTE — PROGRESS NOTES
Arrived from MRI s/p GK frame fitting.  VSS.  Denies pain.  Very sleepy.  Family at bedside.  Resting in bed.

## 2019-10-23 NOTE — PROGRESS NOTES
GAMMA KNIFE RADIOSURGERY TREATMENT SUMMARY  DEPARTMENT OF RADIATION ONCOLOGY    Name: Saritha Church                                        : 1950                 Medical Record #: [id number]                       Diagnosis: Non Small Cell Lung Cancer                      Date of Treatment: 10/23/2019                                       Referring Physicians:  Radha Reddy, True Sauceda, Edward Butt, Tumor Registry     BRIEF CLINICAL HISTORY:                                                                                                 The patient is a 69-year-old white female with a 50-pack-year smoking history who was well until 2018 when she developed headaches and gait instability.  Brain MRI 07/10/2018 showed 3 brain metastases (3.4 cm left medial cerebellum, 1 cm left lateral cerebellum and 0.4 cm left inferior temporal).  Chest CT 07/10/2018 showed a left upper lung mass with mediastinal and hilar adenopathy.  Bronchoscopic biopsy of the lung mass confirmed nonsmall cell lung cancer (adenocarcinoma) which was negative for mutations.  On 2018, she underwent a craniotomy and surgical resection of the 2 metastases in the left cerebellum.  On 2018 she underwent gamma knife radiosurgery for the left cerebellar surgical cavity and also the left inferior temporal brain metastasis.  She was then treated with 4 cycles of carboplatin and Alimta chemotherapy followed by maintenance Alimta.  Serial brain MRIs were stable.  In 2019, the Alimta was discontinued and she was started on nivolumab.  She has now received 5 cycles on an every 4-week schedule.  Routine followup brain MRI 10/09/2019 showed a 0.2 cm metastasis in the medial aspect of the left cerebellum, separate from the surgical cavity.  She was evaluated by Dr. Sauceda on 10/09/2019 and referred for consideration of gamma knife radiosurgery.  The patient denies any symptoms at this time other than minimal hand tremor, which she  attributes to the nivolumab.   TECHNICAL SUMMARY        Collimators    # Site Energy Min Tumor Dose (Gy) IDL (%) # Size (mm) Treatment Vol. (cc)   1 Left Medial Cerebellum [place] 60 18 70 1 4 0.05     DESCRIPTION OF PROCEDURE:                                                                              On 10/23/2019 the patient was brought to the Gamma Knife suite at [facility].  After sedation and topical anesthetic, the head frame was put on by Dr. Suleman Newberry.  The patient was then taken to the department of Radiology where a stereotactic brain MRI was performed.  The patient was admitted to the [facility] where she rested comfortably while treatment planning was completed.  The [employer] Gamma Plan software was used to create a highly conformal dose distribution using the number and size collimators detailed above.  The patient was brought to the Gamma Knife suite.  The treatment was delivered using the [employer]ell Gamma Knife ICON without complication.  The head frame was removed and the patient was discharged home in stable condition.  FOLLOW-UP PLANS:                                                                                                        The Gamma Knife Nurse Coordinator will call the patient tomorrow for short-term follow up.  She should have a brain MRI in 2 months and every 3 months thereafter.  The patient will also follow-up with (jojo Reddy and Komal.  I would be happy to see her anytime.    Dante Masters MD, [facility], [facility]

## 2019-10-24 ENCOUNTER — TELEPHONE (OUTPATIENT)
Dept: RADIATION ONCOLOGY | Facility: CLINIC | Age: 69
End: 2019-10-24

## 2019-10-24 NOTE — TELEPHONE ENCOUNTER
A call was placed to Saritha in follow up to her Gamma Knife treatment on 10/23/19.  Saritha said she thought all was well, no headaches or nausea.  Saritha said the pin sites are tender today, I recommended tylenol and cool compresses off and on, she said she would try that.  Saritha had no other complaints

## (undated) RX ORDER — DEXAMETHASONE 4 MG/1
TABLET ORAL
Status: DISPENSED
Start: 2018-08-22

## (undated) RX ORDER — LIDOCAINE 40 MG/G
CREAM TOPICAL
Status: DISPENSED
Start: 2019-10-23

## (undated) RX ORDER — LORAZEPAM 0.5 MG/1
TABLET ORAL
Status: DISPENSED
Start: 2019-10-23

## (undated) RX ORDER — LORAZEPAM 0.5 MG/1
TABLET ORAL
Status: DISPENSED
Start: 2018-08-22

## (undated) RX ORDER — HEPARIN SODIUM,PORCINE 10 UNIT/ML
VIAL (ML) INTRAVENOUS
Status: DISPENSED
Start: 2019-10-23

## (undated) RX ORDER — HEPARIN SODIUM (PORCINE) LOCK FLUSH IV SOLN 100 UNIT/ML 100 UNIT/ML
SOLUTION INTRAVENOUS
Status: DISPENSED
Start: 2019-10-23

## (undated) RX ORDER — LIDOCAINE 40 MG/G
CREAM TOPICAL
Status: DISPENSED
Start: 2018-08-22